# Patient Record
Sex: FEMALE | Employment: FULL TIME | URBAN - METROPOLITAN AREA
[De-identification: names, ages, dates, MRNs, and addresses within clinical notes are randomized per-mention and may not be internally consistent; named-entity substitution may affect disease eponyms.]

---

## 2018-10-09 PROCEDURE — 86900 BLOOD TYPING SEROLOGIC ABO: CPT | Performed by: OBSTETRICS & GYNECOLOGY

## 2018-10-09 PROCEDURE — 87624 HPV HI-RISK TYP POOLED RSLT: CPT | Performed by: OBSTETRICS & GYNECOLOGY

## 2018-10-09 PROCEDURE — 88175 CYTOPATH C/V AUTO FLUID REDO: CPT | Performed by: OBSTETRICS & GYNECOLOGY

## 2018-10-09 PROCEDURE — 86901 BLOOD TYPING SEROLOGIC RH(D): CPT | Performed by: OBSTETRICS & GYNECOLOGY

## 2018-10-13 ENCOUNTER — APPOINTMENT (OUTPATIENT)
Dept: LAB | Facility: HOSPITAL | Age: 38
End: 2018-10-13
Attending: OBSTETRICS & GYNECOLOGY
Payer: COMMERCIAL

## 2018-10-13 DIAGNOSIS — O02.1 MISSED ABORTION: ICD-10-CM

## 2018-10-13 PROCEDURE — 85027 COMPLETE CBC AUTOMATED: CPT

## 2018-10-13 PROCEDURE — 36415 COLL VENOUS BLD VENIPUNCTURE: CPT

## 2018-10-16 ENCOUNTER — ANESTHESIA (OUTPATIENT)
Dept: SURGERY | Facility: HOSPITAL | Age: 38
End: 2018-10-16
Payer: COMMERCIAL

## 2018-10-16 ENCOUNTER — ANESTHESIA EVENT (OUTPATIENT)
Dept: SURGERY | Facility: HOSPITAL | Age: 38
End: 2018-10-16
Payer: COMMERCIAL

## 2018-10-16 ENCOUNTER — HOSPITAL ENCOUNTER (OUTPATIENT)
Facility: HOSPITAL | Age: 38
Setting detail: HOSPITAL OUTPATIENT SURGERY
Discharge: HOME | End: 2018-10-16
Attending: OBSTETRICS & GYNECOLOGY | Admitting: OBSTETRICS & GYNECOLOGY
Payer: COMMERCIAL

## 2018-10-16 VITALS
HEIGHT: 68 IN | WEIGHT: 145.5 LBS | OXYGEN SATURATION: 100 % | SYSTOLIC BLOOD PRESSURE: 96 MMHG | HEART RATE: 53 BPM | TEMPERATURE: 98 F | BODY MASS INDEX: 22.05 KG/M2 | DIASTOLIC BLOOD PRESSURE: 61 MMHG | RESPIRATION RATE: 20 BRPM

## 2018-10-16 DIAGNOSIS — O02.1 MISSED ABORTION: Primary | ICD-10-CM

## 2018-10-16 PROCEDURE — 88275 CYTOGENETICS 100-300: CPT

## 2018-10-16 PROCEDURE — 88271 CYTOGENETICS DNA PROBE: CPT

## 2018-10-16 PROCEDURE — 88291 CYTO/MOLECULAR REPORT: CPT

## 2018-10-16 PROCEDURE — 10D17ZZ EXTRACTION OF PRODUCTS OF CONCEPTION, RETAINED, VIA NATURAL OR ARTIFICIAL OPENING: ICD-10-PCS | Performed by: OBSTETRICS & GYNECOLOGY

## 2018-10-16 PROCEDURE — 88305 TISSUE EXAM BY PATHOLOGIST: CPT | Performed by: OBSTETRICS & GYNECOLOGY

## 2018-10-16 PROCEDURE — 88321 CONSLTJ&REPRT SLD PREP ELSWR: CPT | Performed by: OBSTETRICS & GYNECOLOGY

## 2018-10-16 PROCEDURE — 88342 IMHCHEM/IMCYTCHM 1ST ANTB: CPT | Performed by: OBSTETRICS & GYNECOLOGY

## 2018-10-16 RX ORDER — LIDOCAINE HYDROCHLORIDE 10 MG/ML
INJECTION, SOLUTION INFILTRATION; PERINEURAL AS NEEDED
Status: DISCONTINUED | OUTPATIENT
Start: 2018-10-16 | End: 2018-10-16 | Stop reason: HOSPADM

## 2018-10-16 RX ORDER — ONDANSETRON 2 MG/ML
4 INJECTION INTRAMUSCULAR; INTRAVENOUS ONCE AS NEEDED
Status: DISCONTINUED | OUTPATIENT
Start: 2018-10-16 | End: 2018-10-16

## 2018-10-16 RX ORDER — HYDROCODONE BITARTRATE AND ACETAMINOPHEN 5; 325 MG/1; MG/1
1 TABLET ORAL AS NEEDED
Status: DISCONTINUED | OUTPATIENT
Start: 2018-10-16 | End: 2018-10-16

## 2018-10-16 RX ORDER — MORPHINE SULFATE 4 MG/ML
2 INJECTION, SOLUTION INTRAMUSCULAR; INTRAVENOUS EVERY 5 MIN PRN
Status: DISCONTINUED | OUTPATIENT
Start: 2018-10-16 | End: 2018-10-16

## 2018-10-16 RX ORDER — SODIUM CHLORIDE, SODIUM LACTATE, POTASSIUM CHLORIDE, CALCIUM CHLORIDE 600; 310; 30; 20 MG/100ML; MG/100ML; MG/100ML; MG/100ML
INJECTION, SOLUTION INTRAVENOUS CONTINUOUS
Status: DISCONTINUED | OUTPATIENT
Start: 2018-10-16 | End: 2018-10-16

## 2018-10-16 RX ORDER — HYDROCODONE BITARTRATE AND ACETAMINOPHEN 5; 325 MG/1; MG/1
2 TABLET ORAL AS NEEDED
Status: DISCONTINUED | OUTPATIENT
Start: 2018-10-16 | End: 2018-10-16

## 2018-10-16 RX ORDER — NALOXONE HYDROCHLORIDE 0.4 MG/ML
80 INJECTION, SOLUTION INTRAMUSCULAR; INTRAVENOUS; SUBCUTANEOUS AS NEEDED
Status: DISCONTINUED | OUTPATIENT
Start: 2018-10-16 | End: 2018-10-16

## 2018-10-16 RX ORDER — ACETAMINOPHEN 500 MG
1000 TABLET ORAL ONCE
Status: DISCONTINUED | OUTPATIENT
Start: 2018-10-16 | End: 2018-10-16 | Stop reason: HOSPADM

## 2018-10-16 RX ORDER — METOCLOPRAMIDE HYDROCHLORIDE 5 MG/ML
10 INJECTION INTRAMUSCULAR; INTRAVENOUS AS NEEDED
Status: DISCONTINUED | OUTPATIENT
Start: 2018-10-16 | End: 2018-10-16

## 2018-10-16 NOTE — ANESTHESIA POSTPROCEDURE EVALUATION
[facility] [place] Patient Status:  Hospital Outpatient Surgery   Age/Gender 30year old female MRN [id number]   [facility] SURGERY Attending Elizabeth Hassan MD   Hosp Day # 0 PCP None Pcp       Anesthesia Post-op Note    Procedu

## 2018-10-16 NOTE — H&P
Gyn Pre-op H&P    38yo  at 9w0d by dates with 6wk missed AB for suction D&C.     OB History    Para Term  AB Living   2 1 1 0 0 1   SAB TAB Ectopic Multiple Live Births   0 0 0 0 1      # Outcome Date GA Lbr Mahad/2nd Weight Sex Delivery

## 2018-10-16 NOTE — ANESTHESIA PREPROCEDURE EVALUATION
PRE-OP EVALUATION    Patient Name: Sanjana Garza    Pre-op Diagnosis: MISSED     Procedure(s):  SUCTION DILATION AND CURETTAGE    Surgeon(s) and Role:     * Kimberly Ruiz MD - Primary    Pre-op vitals reviewed.   Temp: 98.1 °F (36.7 °C)  Pulse: 55 Cardiovascular    Cardiovascular exam normal.  Rhythm: regular  Rate: normal  (-) murmur   Dental    No notable dental history. Pulmonary    Pulmonary exam normal.  Breath sounds clear to auscultation bilaterally.                Other findings

## 2018-10-16 NOTE — OPERATIVE REPORT
Pre-Operative Diagnosis: MISSED      Post-Operative Diagnosis: MISSED       Procedure Performed:   Procedure(s):  SUCTION DILATION AND CURETTAGE    Surgeon(s) and Role:     * Maximiliano Osman MD - Primary    Assistant(s):   none     Surgical

## 2018-11-01 ENCOUNTER — TELEPHONE (OUTPATIENT)
Dept: OBGYN UNIT | Facility: HOSPITAL | Age: 38
End: 2018-11-01

## 2019-03-11 PROBLEM — O09.529 AMA (ADVANCED MATERNAL AGE) MULTIGRAVIDA 35+: Status: ACTIVE | Noted: 2019-03-11

## 2019-03-11 PROBLEM — O09.529 AMA (ADVANCED MATERNAL AGE) MULTIGRAVIDA 35+ (HCC): Status: ACTIVE | Noted: 2019-03-11

## 2019-04-01 PROCEDURE — 86901 BLOOD TYPING SEROLOGIC RH(D): CPT | Performed by: OBSTETRICS & GYNECOLOGY

## 2019-04-01 PROCEDURE — 86900 BLOOD TYPING SEROLOGIC ABO: CPT | Performed by: OBSTETRICS & GYNECOLOGY

## 2019-04-01 PROCEDURE — 87389 HIV-1 AG W/HIV-1&-2 AB AG IA: CPT | Performed by: OBSTETRICS & GYNECOLOGY

## 2019-04-01 PROCEDURE — 86850 RBC ANTIBODY SCREEN: CPT | Performed by: OBSTETRICS & GYNECOLOGY

## 2019-04-09 PROCEDURE — 87086 URINE CULTURE/COLONY COUNT: CPT | Performed by: OBSTETRICS & GYNECOLOGY

## 2019-05-23 PROCEDURE — 82105 ALPHA-FETOPROTEIN SERUM: CPT | Performed by: OBSTETRICS & GYNECOLOGY

## 2019-05-23 PROCEDURE — 36415 COLL VENOUS BLD VENIPUNCTURE: CPT | Performed by: OBSTETRICS & GYNECOLOGY

## 2019-10-09 ENCOUNTER — HOSPITAL ENCOUNTER (INPATIENT)
Facility: HOSPITAL | Age: 39
LOS: 3 days | Discharge: HOME | End: 2019-10-12
Attending: OBSTETRICS & GYNECOLOGY | Admitting: OBSTETRICS & GYNECOLOGY
Payer: COMMERCIAL

## 2019-10-10 PROBLEM — Z34.90 PREGNANCY (HCC): Status: ACTIVE | Noted: 2019-10-10

## 2019-10-10 PROBLEM — Z34.90 PREGNANCY: Status: ACTIVE | Noted: 2019-10-10

## 2019-10-10 PROCEDURE — 86901 BLOOD TYPING SEROLOGIC RH(D): CPT | Performed by: OBSTETRICS & GYNECOLOGY

## 2019-10-10 PROCEDURE — 86850 RBC ANTIBODY SCREEN: CPT | Performed by: OBSTETRICS & GYNECOLOGY

## 2019-10-10 PROCEDURE — 85027 COMPLETE CBC AUTOMATED: CPT | Performed by: OBSTETRICS & GYNECOLOGY

## 2019-10-10 PROCEDURE — 86780 TREPONEMA PALLIDUM: CPT | Performed by: OBSTETRICS & GYNECOLOGY

## 2019-10-10 PROCEDURE — 3E033VJ INTRODUCTION OF OTHER HORMONE INTO PERIPHERAL VEIN, PERCUTANEOUS APPROACH: ICD-10-PCS | Performed by: OBSTETRICS & GYNECOLOGY

## 2019-10-10 PROCEDURE — 86900 BLOOD TYPING SEROLOGIC ABO: CPT | Performed by: OBSTETRICS & GYNECOLOGY

## 2019-10-10 RX ORDER — ACETAMINOPHEN 325 MG/1
650 TABLET ORAL EVERY 6 HOURS PRN
Status: DISCONTINUED | OUTPATIENT
Start: 2019-10-10 | End: 2019-10-12

## 2019-10-10 RX ORDER — IBUPROFEN 600 MG/1
600 TABLET ORAL ONCE AS NEEDED
Status: DISCONTINUED | OUTPATIENT
Start: 2019-10-09 | End: 2019-10-10 | Stop reason: HOSPADM

## 2019-10-10 RX ORDER — TRISODIUM CITRATE DIHYDRATE AND CITRIC ACID MONOHYDRATE 500; 334 MG/5ML; MG/5ML
30 SOLUTION ORAL AS NEEDED
Status: DISCONTINUED | OUTPATIENT
Start: 2019-10-09 | End: 2019-10-10 | Stop reason: HOSPADM

## 2019-10-10 RX ORDER — MELATONIN
325
COMMUNITY
End: 2021-07-20

## 2019-10-10 RX ORDER — AMMONIA INHALANTS 0.04 G/.3ML
0.3 INHALANT RESPIRATORY (INHALATION) AS NEEDED
Status: DISCONTINUED | OUTPATIENT
Start: 2019-10-09 | End: 2019-10-10 | Stop reason: HOSPADM

## 2019-10-10 RX ORDER — SIMETHICONE 80 MG
80 TABLET,CHEWABLE ORAL 3 TIMES DAILY PRN
Status: DISCONTINUED | OUTPATIENT
Start: 2019-10-10 | End: 2019-10-12

## 2019-10-10 RX ORDER — ONDANSETRON 2 MG/ML
4 INJECTION INTRAMUSCULAR; INTRAVENOUS EVERY 6 HOURS PRN
Status: DISCONTINUED | OUTPATIENT
Start: 2019-10-10 | End: 2019-10-10 | Stop reason: HOSPADM

## 2019-10-10 RX ORDER — SODIUM CHLORIDE, SODIUM LACTATE, POTASSIUM CHLORIDE, CALCIUM CHLORIDE 600; 310; 30; 20 MG/100ML; MG/100ML; MG/100ML; MG/100ML
INJECTION, SOLUTION INTRAVENOUS CONTINUOUS
Status: DISCONTINUED | OUTPATIENT
Start: 2019-10-10 | End: 2019-10-10 | Stop reason: HOSPADM

## 2019-10-10 RX ORDER — NALBUPHINE HCL 10 MG/ML
2.5 AMPUL (ML) INJECTION
Status: DISCONTINUED | OUTPATIENT
Start: 2019-10-10 | End: 2019-10-10

## 2019-10-10 RX ORDER — TERBUTALINE SULFATE 1 MG/ML
0.25 INJECTION, SOLUTION SUBCUTANEOUS AS NEEDED
Status: DISCONTINUED | OUTPATIENT
Start: 2019-10-09 | End: 2019-10-10 | Stop reason: HOSPADM

## 2019-10-10 RX ORDER — EPHEDRINE SULFATE/0.9% NACL/PF 25 MG/5 ML
5 SYRINGE (ML) INTRAVENOUS AS NEEDED
Status: DISCONTINUED | OUTPATIENT
Start: 2019-10-10 | End: 2019-10-10

## 2019-10-10 RX ORDER — ZOLPIDEM TARTRATE 5 MG/1
5 TABLET ORAL NIGHTLY PRN
Status: DISCONTINUED | OUTPATIENT
Start: 2019-10-10 | End: 2019-10-12

## 2019-10-10 RX ORDER — BISACODYL 10 MG
10 SUPPOSITORY, RECTAL RECTAL ONCE AS NEEDED
Status: ACTIVE | OUTPATIENT
Start: 2019-10-10 | End: 2019-10-10

## 2019-10-10 RX ORDER — DEXTROSE, SODIUM CHLORIDE, SODIUM LACTATE, POTASSIUM CHLORIDE, AND CALCIUM CHLORIDE 5; .6; .31; .03; .02 G/100ML; G/100ML; G/100ML; G/100ML; G/100ML
INJECTION, SOLUTION INTRAVENOUS AS NEEDED
Status: DISCONTINUED | OUTPATIENT
Start: 2019-10-09 | End: 2019-10-10 | Stop reason: HOSPADM

## 2019-10-10 RX ORDER — IBUPROFEN 600 MG/1
600 TABLET ORAL EVERY 6 HOURS
Status: DISCONTINUED | OUTPATIENT
Start: 2019-10-10 | End: 2019-10-12

## 2019-10-10 RX ORDER — DOCUSATE SODIUM 100 MG/1
100 CAPSULE, LIQUID FILLED ORAL
Status: DISCONTINUED | OUTPATIENT
Start: 2019-10-10 | End: 2019-10-12

## 2019-10-10 NOTE — PROGRESS NOTES
Postpartum Day 0    Pt without complaints.     Temp: 98.1 °F (36.7 °C)  Pulse: 50  Resp: 16  BP: 115/70  abd  soft, NT, ND, fundus firm below umbilicus  perineum NL lochia  extr  trace edema, no calf tenderness  Recent Labs   Lab 10/10/19  0001   RBC 3.61*

## 2019-10-10 NOTE — PROGRESS NOTES
Pt is a 41year old female admitted to [facility]. Patient presents with:  Rupture Of Membranes     Pt is  38w4d intra-uterine pregnancy. History obtained, consents signed. Oriented to room, staff, and plan of care.

## 2019-10-10 NOTE — L&D DELIVERY NOTE
Juan Pablo, Girl Yosi Méndez [[id number]]    Labor Events     labor?:  No   steroids?:  None  Rupture date/time:  10/9/2019 2300     Rupture type:  SROM  Fluid color:  Clear  Augmentation:  Oxytocin  Indications for augmentation:  Ineffective Contract Good, crying              1 Minute:   5 Minute:   10 Minute:   15 Minute:   20 Minute:     Skin color:        Heart rate:        Reflex irritablity:        Muscle tone:        Respiratory effort:         Total:           Apgars assigned by:  Izzy Hutton

## 2019-10-10 NOTE — PLAN OF CARE
NURSING ADMISSION NOTE  Patient admitted via Wheelchair  Oriented to room. Safety precautions initiated. Bed in low position. Call light in reach.     Nubain for itching  Due to void

## 2019-10-10 NOTE — H&P
[address] and Delivery Prenatal History and Physical Interval Addendum  Please see full Prenatal Record for this pregnancy      SUBJECTIVE:    Interval History: This is a  at 38w4d admitted with PROM at 11pm of clear fluid.       OBJ

## 2019-10-10 NOTE — PROGRESS NOTES
Patient up to bathroom with assist x 2. Unable to void at this time. Patient transferred to mother/baby room 2209 per wheelchair in stable condition with baby and personal belongings. Accompanied by significant other and staff.   Report given to mother/bab

## 2019-10-10 NOTE — PLAN OF CARE
Problem: Patient/Family Goals  Goal: Patient/Family Long Term Goal  Description  Patient's Long Term Goal: patient will have uncomplicated delivery of     Interventions:  - See additional Care Plan goals for specific interventions   Outcome: Progr

## 2019-10-11 PROCEDURE — 85025 COMPLETE CBC W/AUTO DIFF WBC: CPT | Performed by: OBSTETRICS & GYNECOLOGY

## 2019-10-11 NOTE — PLAN OF CARE
Doing well, ambulated in shah, rooming in most of noc with baby. Using breast pump. Warm packs for cramping.

## 2019-10-11 NOTE — PROGRESS NOTES
Postpartum Day 1    Pt without complaints.     Temp: 97.8 °F (36.6 °C)  Pulse: 54  Resp: 16  BP: 105/53  abd  soft, NT, ND, fundus firm below umbilicus  perineum NL lochia  extr  trace edema, no calf tenderness  Recent Labs   Lab 10/11/19  0709   RBC 3.13*

## 2019-10-12 VITALS
RESPIRATION RATE: 18 BRPM | HEART RATE: 57 BPM | SYSTOLIC BLOOD PRESSURE: 105 MMHG | BODY MASS INDEX: 27.28 KG/M2 | WEIGHT: 180 LBS | HEIGHT: 68 IN | DIASTOLIC BLOOD PRESSURE: 65 MMHG | TEMPERATURE: 98 F | OXYGEN SATURATION: 98 %

## 2019-10-12 PROBLEM — Z34.90 PREGNANCY: Status: RESOLVED | Noted: 2019-10-10 | Resolved: 2019-10-12

## 2019-10-12 PROBLEM — O09.529 AMA (ADVANCED MATERNAL AGE) MULTIGRAVIDA 35+ (HCC): Status: RESOLVED | Noted: 2019-03-11 | Resolved: 2019-10-12

## 2019-10-12 PROBLEM — Z34.90 PREGNANCY (HCC): Status: RESOLVED | Noted: 2019-10-10 | Resolved: 2019-10-12

## 2019-10-12 PROBLEM — O09.529 AMA (ADVANCED MATERNAL AGE) MULTIGRAVIDA 35+: Status: RESOLVED | Noted: 2019-03-11 | Resolved: 2019-10-12

## 2019-10-12 NOTE — PROGRESS NOTES
S: pt without complaints.   Lochia light  O: VS-Temp:  [97.8 °F (36.6 °C)-98.1 °F (36.7 °C)] 98.1 °F (36.7 °C)  Pulse:  [57-59] 57  Resp:  [18] 18  BP: (103-105)/(65-69) 105/65       Abdomen- soft ND NT, uterus firm and contracted       Extremities- no rizwana

## 2019-10-14 ENCOUNTER — TELEPHONE (OUTPATIENT)
Dept: OBGYN UNIT | Facility: HOSPITAL | Age: 39
End: 2019-10-14

## 2019-11-20 ENCOUNTER — WALK IN (OUTPATIENT)
Dept: URGENT CARE | Age: 39
End: 2019-11-20

## 2019-11-20 VITALS
RESPIRATION RATE: 18 BRPM | TEMPERATURE: 98.1 F | OXYGEN SATURATION: 98 % | WEIGHT: 150 LBS | DIASTOLIC BLOOD PRESSURE: 60 MMHG | BODY MASS INDEX: 22.73 KG/M2 | HEIGHT: 68 IN | HEART RATE: 73 BPM | SYSTOLIC BLOOD PRESSURE: 120 MMHG

## 2019-11-20 DIAGNOSIS — J06.9 VIRAL URI: Primary | ICD-10-CM

## 2019-11-20 PROCEDURE — 99203 OFFICE O/P NEW LOW 30 MIN: CPT | Performed by: NURSE PRACTITIONER

## 2019-11-20 RX ORDER — FLUTICASONE PROPIONATE 50 MCG
2 SPRAY, SUSPENSION (ML) NASAL DAILY
Qty: 16 G | Refills: 1 | Status: SHIPPED | OUTPATIENT
Start: 2019-11-20

## 2019-11-20 ASSESSMENT — ENCOUNTER SYMPTOMS
FEVER: 0
DIAPHORESIS: 0
COUGH: 0
ADENOPATHY: 0
ACTIVITY CHANGE: 0
HEADACHES: 0
CHILLS: 0
UNEXPECTED WEIGHT CHANGE: 0
APPETITE CHANGE: 0
FATIGUE: 0

## 2021-11-23 ENCOUNTER — OFFICE VISIT (OUTPATIENT)
Dept: FAMILY MEDICINE CLINIC | Facility: CLINIC | Age: 41
End: 2021-11-23
Payer: COMMERCIAL

## 2021-11-23 VITALS
BODY MASS INDEX: 23.7 KG/M2 | RESPIRATION RATE: 16 BRPM | DIASTOLIC BLOOD PRESSURE: 72 MMHG | HEIGHT: 67 IN | SYSTOLIC BLOOD PRESSURE: 118 MMHG | HEART RATE: 72 BPM | OXYGEN SATURATION: 98 % | WEIGHT: 151 LBS

## 2021-11-23 DIAGNOSIS — B00.1 RECURRENT COLD SORES: Primary | ICD-10-CM

## 2021-11-23 PROCEDURE — 3008F BODY MASS INDEX DOCD: CPT | Performed by: FAMILY MEDICINE

## 2021-11-23 PROCEDURE — 3074F SYST BP LT 130 MM HG: CPT | Performed by: FAMILY MEDICINE

## 2021-11-23 PROCEDURE — 99203 OFFICE O/P NEW LOW 30 MIN: CPT | Performed by: FAMILY MEDICINE

## 2021-11-23 PROCEDURE — 3078F DIAST BP <80 MM HG: CPT | Performed by: FAMILY MEDICINE

## 2021-11-23 RX ORDER — VALACYCLOVIR HYDROCHLORIDE 1 G/1
2000 TABLET, FILM COATED ORAL EVERY 12 HOURS SCHEDULED
Qty: 28 TABLET | Refills: 0 | Status: SHIPPED | OUTPATIENT
Start: 2021-11-23 | End: 2021-11-30

## 2021-11-23 NOTE — PROGRESS NOTES
[facility] Progress Note    SUBJECTIVE: Mitchell Oxana Long 46year old female is here today for Patient presents with:  Canker Sores: frequent cold sores      Primary reason she is here she gets cold sores.  Usually does a telemed through her insuranc

## 2022-07-22 DIAGNOSIS — B00.1 RECURRENT COLD SORES: ICD-10-CM

## 2022-07-22 RX ORDER — VALACYCLOVIR HYDROCHLORIDE 1 G/1
2000 TABLET, FILM COATED ORAL EVERY 12 HOURS SCHEDULED
Qty: 28 TABLET | Refills: 0 | Status: SHIPPED | OUTPATIENT
Start: 2022-07-22 | End: 2022-07-29

## 2022-12-13 DIAGNOSIS — B00.1 RECURRENT COLD SORES: ICD-10-CM

## 2022-12-13 RX ORDER — VALACYCLOVIR HYDROCHLORIDE 1 G/1
2000 TABLET, FILM COATED ORAL EVERY 12 HOURS SCHEDULED
Qty: 28 TABLET | Refills: 0 | Status: SHIPPED | OUTPATIENT
Start: 2022-12-13 | End: 2022-12-20

## 2023-09-25 DIAGNOSIS — B00.1 RECURRENT COLD SORES: ICD-10-CM

## 2023-09-25 RX ORDER — VALACYCLOVIR HYDROCHLORIDE 1 G/1
2000 TABLET, FILM COATED ORAL EVERY 12 HOURS
Qty: 28 TABLET | Refills: 0 | OUTPATIENT
Start: 2023-09-25

## 2023-09-28 ENCOUNTER — TELEMEDICINE (OUTPATIENT)
Dept: FAMILY MEDICINE CLINIC | Facility: CLINIC | Age: 43
End: 2023-09-28
Payer: COMMERCIAL

## 2023-09-28 DIAGNOSIS — B00.1 RECURRENT COLD SORES: ICD-10-CM

## 2023-09-28 RX ORDER — VALACYCLOVIR HYDROCHLORIDE 1 G/1
TABLET, FILM COATED ORAL
Qty: 28 TABLET | Refills: 3 | Status: SHIPPED | OUTPATIENT
Start: 2023-09-28

## 2023-09-28 NOTE — PROGRESS NOTES
Video Visit  Tisha Lan is a 38year old female. No chief complaint on file. HPI:   Pt requests a video visit due to the Covid pandemic to discuss frequent cold sores. Saw Dr. Aleksey Mckeon for cold sores. Nose and lips. May get 1-2 outbreaks a year. Sees Dr. Zana Andino for [address] Pap and mammogram utd. Current Outpatient Medications   Medication Sig Dispense Refill    Multiple Vitamin (MULTI-VITAMIN DAILY) Oral Tab Take by mouth. Norethin Ace-Eth Estrad-FE (MICROGESTIN FE 1/20) 1-20 MG-MCG Oral Tab Take 1 tablet by mouth daily. 3 Package 4      Past Medical History:   Diagnosis Date    Visual impairment     contact lenses and glasses      Past Surgical History:   Procedure Laterality Date    D & c  10/16/2018    missed AB      Social History:  Social History     Socioeconomic History    Marital status:    Tobacco Use    Smoking status: Never    Smokeless tobacco: Never   Vaping Use    Vaping Use: Never used   Substance and Sexual Activity    Alcohol use: No    Drug use: No    Sexual activity: Yes     Partners: Male     Birth control/protection: Pill        REVIEW OF SYSTEMS:   GENERAL HEALTH: Denies fevers, chills or myalgias  HEENT: Denies ear pain, nasal congestion, sinus pain, or sore throat  SKIN: Denies rash  LUNGS: Denies shortness of breath, wheezing, or cough  CV: Denies chest pain or palpitations; denies lightheadedness  GI: Denies abdominal pain, denies N/V/D  MS: Denies back, neck or joint pain  NEURO: Denies headaches  ALLERGY/ASTHMA: Denies asthma and environmental allergies       EXAM:   GENERAL: well developed, well nourished, NAD. SKIN: No rash visible  HEENT: AT/NC. Normal lips, gums and teeth; no hyponasal tone on video visit  LUNGS: Speaking in complete sentences comfortably without increased work of breathing; no cough during visit  PSYCH: Normal mood and affect, A&Ox3, affect is consistent with mood      ASSESSMENT AND PLAN:   1.  Recurrent cold sores  - discussed combining valtrex with OTC Abreva  - encouraged Lysine loading prn  - reviewed food and environmental triggers to avoid, encouraged lip balm with SPF  - valACYclovir 1 G Oral Tab; 2 tabs po Q 12 hours x 1 day prn cold sore  Dispense: 28 tablet; Refill: 3          The patient indicates understanding of these issues and agrees to the plan. Follow up prn     Duration of visit: 10 min      [address] understands a video evaluation is not a substitute for face-to-face examination or emergency care. Patient advised to go to ER or call 911 for worsening symptoms or acute distress. Please note that the following visit was completed using two-way, real-time interactive video communication. This has been done in good sneha to provide continuity of care in the best interest of the provider-patient relationship, due to the ongoing public health crisis/national emergency and because of restrictions of visitation. There are limitations of this visit as limited physical exam could be performed. Every conscious effort was taken to allow for sufficient and adequate time. This billing was spent on reviewing labs, medications, radiology tests and decision making. Appropriate medical decision-making and tests are ordered as detailed in the plan of care above.

## 2024-04-09 ENCOUNTER — V-VISIT (OUTPATIENT)
Dept: URGENT CARE | Age: 44
End: 2024-04-09

## 2024-04-09 VITALS
BODY MASS INDEX: 22.73 KG/M2 | HEIGHT: 68 IN | SYSTOLIC BLOOD PRESSURE: 110 MMHG | TEMPERATURE: 98 F | WEIGHT: 150 LBS | HEART RATE: 66 BPM | DIASTOLIC BLOOD PRESSURE: 76 MMHG | RESPIRATION RATE: 16 BRPM | OXYGEN SATURATION: 100 %

## 2024-04-09 DIAGNOSIS — J06.9 VIRAL UPPER RESPIRATORY TRACT INFECTION: Primary | ICD-10-CM

## 2024-04-09 LAB
INTERNAL PROCEDURAL CONTROLS ACCEPTABLE: YES
S PYO AG THROAT QL IA.RAPID: NEGATIVE
TEST LOT EXPIRATION DATE: ABNORMAL
TEST LOT NUMBER: ABNORMAL

## 2024-04-09 PROCEDURE — 87077 CULTURE AEROBIC IDENTIFY: CPT | Performed by: CLINICAL MEDICAL LABORATORY

## 2024-04-09 PROCEDURE — 99202 OFFICE O/P NEW SF 15 MIN: CPT | Performed by: NURSE PRACTITIONER

## 2024-04-09 PROCEDURE — 87880 STREP A ASSAY W/OPTIC: CPT | Performed by: NURSE PRACTITIONER

## 2024-04-09 PROCEDURE — 87081 CULTURE SCREEN ONLY: CPT | Performed by: CLINICAL MEDICAL LABORATORY

## 2024-04-09 ASSESSMENT — ENCOUNTER SYMPTOMS
CHANGE IN BOWEL HABIT: 0
FATIGUE: 1
NEUROLOGICAL NEGATIVE: 1
TROUBLE SWALLOWING: 0
CHEST TIGHTNESS: 0
VOMITING: 0
WHEEZING: 0
DIARRHEA: 0
NAUSEA: 0
FEVER: 0
SHORTNESS OF BREATH: 0

## 2024-04-11 ENCOUNTER — TELEPHONE (OUTPATIENT)
Dept: FAMILY MEDICINE | Age: 44
End: 2024-04-11

## 2024-04-11 ENCOUNTER — TELEPHONE (OUTPATIENT)
Dept: URGENT CARE | Age: 44
End: 2024-04-11

## 2024-04-11 LAB — S PYO SPEC QL CULT: ABNORMAL

## 2024-04-11 RX ORDER — AMOXICILLIN 500 MG/1
500 CAPSULE ORAL 2 TIMES DAILY
Qty: 20 CAPSULE | Refills: 0 | Status: SHIPPED | OUTPATIENT
Start: 2024-04-11 | End: 2024-04-21

## 2024-04-16 ENCOUNTER — TELEMEDICINE (OUTPATIENT)
Dept: FAMILY MEDICINE CLINIC | Facility: CLINIC | Age: 44
End: 2024-04-16
Payer: COMMERCIAL

## 2024-04-16 DIAGNOSIS — B00.1 RECURRENT COLD SORES: Primary | ICD-10-CM

## 2024-04-16 PROCEDURE — 99213 OFFICE O/P EST LOW 20 MIN: CPT | Performed by: FAMILY MEDICINE

## 2024-04-16 RX ORDER — VALACYCLOVIR HYDROCHLORIDE 500 MG/1
500 TABLET, FILM COATED ORAL DAILY
Qty: 90 TABLET | Refills: 1 | Status: SHIPPED | OUTPATIENT
Start: 2024-04-16

## 2024-04-16 NOTE — PROGRESS NOTES
This visit is conducted using Telemedicine with live, interactive video and audio.    Patient has been referred to the [facility] website at [email or web address] to review the yearly Consent to Treat document.    Patient understands and accepts financial responsibility for any deductible, co-insurance and/or co-pays associated with this service.  [facility] Progress Note    SUBJECTIVE: Michaela Almeida 43 year old female is here today for No chief complaint on file.      Michaela calls in today.    She has been having cold sores, has been taking it when she feels one coming on. Still occurs, speeds it up, but dealing with it.    Wondering about suppressive therapy instead, due to frequency, at least once a month, and they are quite obvious.    PMH  Past Medical History:    Visual impairment    contact lenses and glasses        PSH  Past Surgical History:   Procedure Laterality Date    D & c  10/16/2018    missed AB        Social Hx:  No changes    ROS  See HPI    OBJECTIVE:  There were no vitals taken for this visit.      Labs:          Meds:   Current Outpatient Medications   Medication Sig Dispense Refill    valACYclovir 500 MG Oral Tab Take 1 tablet (500 mg total) by mouth daily. 90 tablet 1    valACYclovir 1 G Oral Tab 2 tabs po Q 12 hours x 1 day prn cold sore 28 tablet 3    Multiple Vitamin (MULTI-VITAMIN DAILY) Oral Tab Take by mouth.      Norethin Ace-Eth Estrad-FE (MICROGESTIN FE 1/20) 1-20 MG-MCG Oral Tab Take 1 tablet by mouth daily. 3 Package 4         Assessment/Plan  Diagnoses and all orders for this visit:    Recurrent cold sores  -     valACYclovir 500 MG Oral Tab; Take 1 tablet (500 mg total) by mouth daily.         Will plan on starting suppressive therapy, if breakthroughs every 2 months or more would plan on increasing to 1000 mg a day. Can still do treatment dosing for break outs.         Total Time spent with patient and coordinating care:  8 minutes.    Follow up: as needed      Cruz  MD Robby

## 2024-12-29 DIAGNOSIS — B00.1 RECURRENT COLD SORES: ICD-10-CM

## 2024-12-30 RX ORDER — VALACYCLOVIR HYDROCHLORIDE 1 G/1
TABLET, FILM COATED ORAL
Qty: 28 TABLET | Refills: 3 | Status: SHIPPED | OUTPATIENT
Start: 2024-12-30

## (undated) DEVICE — CURRETTE 7MM CVD

## (undated) DEVICE — GYN CDS: Brand: MEDLINE INDUSTRIES, INC.

## (undated) DEVICE — NEEDLE SPINAL 22X3-1/2 BLK

## (undated) DEVICE — SOL  .9 1000ML BTL

## (undated) DEVICE — CANISTER SAFETOUCH SYST DISP

## (undated) DEVICE — KENDALL SCD EXPRESS SLEEVES, KNEE LENGTH, MEDIUM: Brand: KENDALL SCD

## (undated) DEVICE — TUBING SUCTION COLLECTION SET

## (undated) DEVICE — GLOVE SURG TRIUMPH SZ 6-1/2